# Patient Record
(demographics unavailable — no encounter records)

---

## 2024-11-16 NOTE — HISTORY OF PRESENT ILLNESS
[FreeTextEntry1] : Since his last visit, Mr. 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  Had R ptosis initially resolved with rest.  Following day had recurrence of ptosis followed by diplopia fluctuating.   Had decreased mycophenolate from 500 mg BID to 250 mg BID.  Had no had recommended b/w done at last visit.  Family noted slight nasal dysarthria with problems clearing his throat prompting hospital ER visit earlier this week.  While in the ED was seen by neurology ACP and had VC/NIF 4L/-40 and was deemed fairly stable and discharged on pyridostigmine 60 mg TID with mycophenolate 500 mg BID.    Since discharge had been taking pyridostigmine TID but still continues to have recurrent ptosis and diplopia 2 hrs after dose.  Denies any side effects to medications.  Last CT Chest 1.5 y ago.  Denies any orthopnea but has some MENDEZ.  No swallowing or dysarthria currently.

## 2024-11-16 NOTE — PHYSICAL EXAM
[FreeTextEntry1] : NAD. AOx3. Intact memory. Speech fluent, nondysarthric. CN 2 - 12 w/ intermittent R ptosis with fatigue and diplopia on sustained upgaze and lateral gaze.  NF/NE 5/5. no fatigue. Strength 5/5 b/l UE/LE. NL tone, bulk. No abnl movements. DTRs 2+ throughout. Plantar response downgoing b/l. (-) Hoffmans, clonus. Sensory intact LT/PP, pain, temp, proprioception and vibration. NL FTN/HKS. No dysdiadokinesia. Gait narrow based/NL tandem.

## 2024-11-16 NOTE — ASSESSMENT
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  Did not have serologic testing since last visit suspect persistent MG despite thymectomy recommend increase mycophenolate and restart prednisone as temporary measure.    Recommendation: - check b/w including AChR Abs  - increase mycophenolate to goal of 1g BID - restart prednisone 20 mg QD and taper slowly over 2 months to 15 mg QD - continue pyridostigmine 60 mg TID - QID - continue secondary stroke prevention with ASA 81, statins -- restart PPI - f/u w PCP for DM - RTC 3 months.

## 2024-11-16 NOTE — ASSESSMENT
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  Did not have serologic testing since last visit suspect persistent MG despite thymectomy recommend increase mycophenolate and restart prednisone as temporary measure.    Recommendation: - check b/w including AChR Abs  - increase mycophenolate to goal of 1g BID - restart prednisone 20 mg QD and taper slowly over 2 months to 15 mg QD - continue pyridostigmine 60 mg TID - QID - continue secondary stroke prevention with ASA 81, statins -- restart PPI - f/u w PCP for DM - RTC 3 months. Discharged

## 2025-03-25 NOTE — PHYSICAL EXAM
[Sclera] : the sclera and conjunctiva were normal [Neck Appearance] : the appearance of the neck was normal [Respiration, Rhythm And Depth] : normal respiratory rhythm and effort [Heart Rate And Rhythm] : heart rate was normal and rhythm regular [Bowel Sounds] : normal bowel sounds [Abdomen Soft] : soft [Involuntary Movements] : no involuntary movements were seen [Skin Color & Pigmentation] : normal skin color and pigmentation [Skin Turgor] : normal skin turgor [No Focal Deficits] : no focal deficits [Oriented To Time, Place, And Person] : oriented to person, place, and time [Impaired Insight] : insight and judgment were intact

## 2025-04-01 NOTE — HISTORY OF PRESENT ILLNESS
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  Pt had CT chest for surveillance revealing hiatal hernia. Here today for review of esophagram and CT chest.   PMHx/PSHx of renal CA- s/p partial nephrectomy, GERD, myasthenia gravis, HTN, hypercholesteremia, kidney stones, and obstructive sleep apnea on CPAP, Covid/PNA 8/2021 and endocarditis 2021 and Robotic Right and Left VATS, Thymectomy from 6/2021, last CT imaging revealed 6 cm hiatal hernia, arrives today for follow up of a recent esophagram.  Baseline GERD score: 7 on PPI   His healthcare teams is as follows: PMD: (Bubba Barreto Cardio: Noble Neuro: Dr. Paul Nephro: Pratik Uro: Alejandro RA: Moshrefdi  ECOG 0 independent  Former smoker quit 27 years ago Works as

## 2025-04-01 NOTE — ASSESSMENT
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  Pt had CT chest for surveillance revealing hiatal hernia. Here today for review of esophagram and CT chest.  CT chest reviewed Stable lung nodules - images reviewed back to 2021 Esophagram done - moderate HH  On omeprazole - doing well   F/U CTS in 1 year with CT chest and for surgical discussion for hiatal hernia Referred to GI   I, Miguel Vigil saw, examined and reviewed the diagnostic images on patient:  LYSSA ZAMAN on 03/25/2025 and agreed with my Nurse Practitioner's clinical note, physical exam findings and treatment plan. Patient presented to the office for a surveillance follow-up.  He has a diagnosis of myasthenia gravis.  He is a status post robotic assisted left thoracoscopy extended thymectomy.  Procedure date June 2021.  Procedure without complications.  Patient with significant improvement in myasthenia symptoms after resection but last year had to go back on medical treatment.  Overall myasthenia symptoms are significantly better compared to before resection.  CT scan of the chest from 3/4/2025 no evidence of recurrence.  Esophagram with a moderate hiatal hernia and gastroesophageal reflux to the level of the aortic arch.  I have a lengthy discussion regarding surgical treatment of hiatal hernia and reflux disease.  Surgical risk and possible complication as well as expected recovery and expected lifestyle modification discussed.  Patient is not sure about surgical treatment at this time.  He is to return to the office in 1 year with a chest CT.

## 2025-04-01 NOTE — ASSESSMENT
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  Pt had CT chest for surveillance revealing hiatal hernia. Here today for review of esophagram and CT chest.  CT chest reviewed Stable lung nodules - images reviewed back to 2021 Esophagram done - moderate HH  On omeprazole - doing well   F/U CTS in 1 year with CT chest and for surgical discussion for hiatal hernia Referred to GI   I, Miguel Vigil saw, examined and reviewed the diagnostic images on patient:  LYSSA ZAMAN on 03/25/2025 and agreed with my Nurse Practitioner's clinical note, physical exam findings and treatment plan. Patient presented to the office for a surveillance follow-up.  He has a diagnosis of myasthenia gravis.  He is a status post robotic assisted left thoracoscopy extended thymectomy.  Procedure date June 2021.  Procedure without complications.  Patient with significant improvement in myasthenia symptoms after resection but last year had to go back on medical treatment.  Overall myasthenia symptoms are significantly better compared to before resection.  CT scan of the chest from 3/4/2025 no evidence of recurrence.  Esophagram with a moderate hiatal hernia and gastroesophageal reflux to the level of the aortic arch.  I have a lengthy discussion regarding surgical treatment of hiatal hernia and reflux disease.  Surgical risk and possible complication as well as expected recovery and expected lifestyle modification discussed.  Patient is not sure about surgical treatment at this time.  He is to return to the office in 1 year with a chest CT.

## 2025-04-01 NOTE — HISTORY OF PRESENT ILLNESS
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  Pt had CT chest for surveillance revealing hiatal hernia. Here today for review of esophagram and CT chest.   PMHx/PSHx of renal CA- s/p partial nephrectomy, GERD, myasthenia gravis, HTN, hypercholesteremia, kidney stones, and obstructive sleep apnea on CPAP, Covid/PNA 8/2021 and endocarditis 2021 and Robotic Right and Left VATS, Thymectomy from 6/2021, last CT imaging revealed 6 cm hiatal hernia, arrives today for follow up of a recent esophagram.  Baseline GERD score: 7 on PPI   His healthcare teams is as follows: PMD: (Bubba Barreto Cardio: Noble Neuro: Dr. Paul Nephro: Pratik Uro: Alejandro RA: Moshrefdi  ECOG 0 independent  Former smoker quit 27 years ago Works as

## 2025-05-22 NOTE — HISTORY OF PRESENT ILLNESS
[FreeTextEntry1] : 63 yo RHM with PMHx of generalized MG (ACHr+ class 2b) s/p robotic assist VATS thymectomy complicated by bacteremia/endocarditis, RCC s/p partial L nephrectomy, nephrolithiasis, HTN, HLD, prior severe COVID presenting for follow up.  Since last visit, he had some rough time towards the end of the year however since January though, he feels more normal. Though he gets some mild right eye ptosis daily without any double vision.  In addition, he gets mild SOB on exertion.   At the end of Feb and beginning of March, he weaned himself off of prednisone. Despite stopping the prednisone, he feels like he has been doing okay. Denies any adverse reaction from his meds.   MG- ADL: 3 - daiily mild right eye droop, MENDEZ

## 2025-05-22 NOTE — HISTORY OF PRESENT ILLNESS
[FreeTextEntry1] : 65 yo RHM with PMHx of generalized MG (ACHr+ class 2b) s/p robotic assist VATS thymectomy complicated by bacteremia/endocarditis, RCC s/p partial L nephrectomy, nephrolithiasis, HTN, HLD, prior severe COVID presenting for follow up.  Since last visit, he had some rough time towards the end of the year however since January though, he feels more normal. Though he gets some mild right eye ptosis daily without any double vision.  In addition, he gets mild SOB on exertion.   At the end of Feb and beginning of March, he weaned himself off of prednisone. Despite stopping the prednisone, he feels like he has been doing okay. Denies any adverse reaction from his meds.   MG- ADL: 3 - daiily mild right eye droop, MENDEZ

## 2025-05-22 NOTE — ASSESSMENT
[FreeTextEntry1] : 65 yo RHM with PMHx of generalized MG (ACHr+ class 2b) s/p robotic assist VATS thymectomy complicated by bacteremia/endocarditis, RCC s/p partial L nephrectomy, nephrolithiasis, HTN, HLD, prior severe COVID presenting for follow up. On exam, has diplopia on right gaze with curtain sign + on the right. Review of blood work should still elevated ACHR antibodies. Otherwise appears mostly controlled on mycophenolate and pyridostigmine. Would adjust meds to optimize symptom control   Plan: - Increase mycophenolate to 1g BID - c/w omeprazole as needed for hiatal hernia - c/w pyridostigmine 60mg TID as needed  - Obtain blood work CBC, CMP, amylase, lipase, GGT, lipid panel - Follow up in 6 months

## 2025-05-22 NOTE — PHYSICAL EXAM
[FreeTextEntry1] : Mental status: Awake, alert and oriented x3.  Recent and remote memory intact.  Naming, repetition and comprehension intact.  Attention/concentration intact.  No dysarthria, no aphasia.  Fund of knowledge appropriate.   Cranial nerves: Pupils equally round and reactive to light, visual fields full but diplopia on right gaze, no nystagmus, extraocular muscles intact, V1 through V3 intact bilaterally and symmetric, face symmetric, hearing intact to finger rub, palate elevation symmetric, tongue was midline.   Motor:  MRC grading 5/5 b/l UE/LE.   strength 5/5.  Normal tone and bulk.  No abnormal movements.   Sensation: Intact to light touch   Coordination: No dysmetria on finger-to-nose.  No dysdiadokinesia.   Reflexes: 2+ in bilateral UE/LE, downgoing toes bilaterally. (-) Jones.   Gait: Narrow and steady. No ataxia.  Romberg negative  Special test: Curtain sign + on the right

## 2025-06-17 NOTE — ASSESSMENT
[FreeTextEntry1] : 64 yr old male with Myasthenia Gravis, H/O Endocarditis in 2021 (followed by Dr. Dickey),H/O B/L DVTs, HLD, H/O Renal ca, S/P partial nephrectomy, HTN, MITZI (on CPAP), GERD, Hiatal hernia, presents for CRC screening.     GERD, longstanding with acute exacerbation  - Will schedule an EGD; risks vs benefits discussed - Pt told that he needs Cardiac clearance from Dr. Dickey to have procedures done  - He was educated about the GERD diet - Continue omeprazole 20 mg OD prn for now  CRC Screening - Will schedule a colonoscopy on the same day as the EGD; risks vs benefits discussed but need Cardiac clearanced from Dr. Dickey   F/U after procedures are done

## 2025-06-17 NOTE — HISTORY OF PRESENT ILLNESS
[FreeTextEntry1] : 64 yr old male with Myasthenia Gravis, H/O Endocarditis in 2021 (followed by Dr. Dickey),H/O B/L DVTs, HLD, H/O Renal ca, S/P partial nephrectomy, HTN, MITZI (on CPAP), GERD, Hiatal hernia, presents for CRC screening.   He has a H/O GERD overall controlled by omeprazole 20 mg OD prn; he usually takes it 4 times a week. He takes it regularly whenever he is on steroids for Myasthenia Gravis. No dysphagia, vomiting, abd pain. He has lost 17 bs in the past 6 months intentionally. No constipation or blood in the stool.   His mother had gastric cancer. No family H/O colon, or other GI cancers. Last EGD was done about 44 yrs ago  Esophagram done 3/4/25 reviewed: GERD to the level of the aortic arch and a moderate hiatal hernia  Labs done 5/22/25 reviewed: CBC Hgb 15.8 CMP GFR 95 LFTs normal    INTERVAL HPI/OVERNIGHT EVENTS:  Pt seen and examined at bedside. No acute overnight events or complaints.    Brief Daily Plan:  -    VITAL SIGNS:  T(F): 100.6 (01-01-25 @ 03:42)  HR: 107 (01-01-25 @ 03:42)  BP: 111/66 (12-31-24 @ 20:19)  RR: 19 (12-31-24 @ 20:19)  SpO2: 99% (12-31-24 @ 20:19)  Wt(kg): --    PHYSICAL EXAM:    CONSTITUTIONAL: Uncomfortable  EYES: PERRL, EOMI, No conjunctival or scleral injection, non-icteric  HENMT: macrocephaly, MMM. poor dentition  NECK: Supple  RESP: No respiratory distress, no use of accessory muscles; CTA b/l, no W/R/R  CV: RRR, 3/6 systolic murmur  GI: Soft, NT, ND  MSK: No clubbing or cyanosis. 1+ BLE edema  SKIN: No rashes or ulcers noted  NEURO: CN II-XII grossly intact; no focal deficits  PSYCH: A&O x 3, affect appropriate      MEDICATIONS  (STANDING):  heparin   Injectable 5000 Unit(s) SubCutaneous every 12 hours  naloxone Injectable 0.4 milliGRAM(s) IV Push once  oseltamivir 30 milliGRAM(s) Oral every 24 hours  piperacillin/tazobactam IVPB.. 3.375 Gram(s) IV Intermittent every 12 hours  polyethylene glycol 3350 17 Gram(s) Oral daily  senna 2 Tablet(s) Oral at bedtime    MEDICATIONS  (PRN):  bisacodyl 5 milliGRAM(s) Oral daily PRN Constipation  melatonin 5 milliGRAM(s) Oral at bedtime PRN Insomnia  oxyCODONE    IR 10 milliGRAM(s) Oral every 8 hours PRN Moderate Pain (4 - 6)  oxyCODONE    IR 15 milliGRAM(s) Oral every 8 hours PRN Severe Pain (7 - 10)      Allergies    shellfish (Hives)  No Known Drug Allergies    Intolerances        LABS:                        10.7   3.72  )-----------( 143      ( 30 Dec 2024 18:14 )             33.5     12-30    139  |  94[L]  |  36[H]  ----------------------------<  82  4.4   |  28  |  5.87[H]    Ca    9.3      30 Dec 2024 18:14    TPro  7.1  /  Alb  4.0  /  TBili  0.4  /  DBili  x   /  AST  7[L]  /  ALT  <5[L]  /  AlkPhos  1215[H]  12-30    PT/INR - ( 30 Dec 2024 18:14 )   PT: 12.8 sec;   INR: 1.12 ratio         PTT - ( 30 Dec 2024 18:14 )  PTT:30.0 sec  Urinalysis Basic - ( 30 Dec 2024 18:14 )    Color: x / Appearance: x / SG: x / pH: x  Gluc: 82 mg/dL / Ketone: x  / Bili: x / Urobili: x   Blood: x / Protein: x / Nitrite: x   Leuk Esterase: x / RBC: x / WBC x   Sq Epi: x / Non Sq Epi: x / Bacteria: x        RADIOLOGY & ADDITIONAL TESTS:  Reviewed  ******************  Authored By: Chaim Buck MD, PGY1  MS Teams Preferred  ******************   INTERVAL HPI/OVERNIGHT EVENTS:  Pt seen and examined at bedside. No acute overnight events or complaints.    Brief Daily Plan:  - potassium 7 on BMP with increase of cr 5s to 10s.   - reached out to nephrology to do dialysis today before treating hyperkalemia medically  - cw empiric abx and tamiflu while pending blood cultures     VITAL SIGNS:  T(F): 100.6 (01-01-25 @ 03:42)  HR: 107 (01-01-25 @ 03:42)  BP: 111/66 (12-31-24 @ 20:19)  RR: 19 (12-31-24 @ 20:19)  SpO2: 99% (12-31-24 @ 20:19)  Wt(kg): --    PHYSICAL EXAM:    CONSTITUTIONAL: Uncomfortable  EYES: PERRL, EOMI, No conjunctival or scleral injection, non-icteric  HENMT: macrocephaly, MMM. poor dentition  NECK: Supple  RESP: No respiratory distress, no use of accessory muscles; CTA b/l, no W/R/R  CV: RRR, 3/6 systolic murmur  GI: Soft, NT, ND  MSK: No clubbing or cyanosis. 1+ BLE edema  SKIN: No rashes or ulcers noted  NEURO: CN II-XII grossly intact; no focal deficits  PSYCH: A&O x 3, affect appropriate      MEDICATIONS  (STANDING):  heparin   Injectable 5000 Unit(s) SubCutaneous every 12 hours  naloxone Injectable 0.4 milliGRAM(s) IV Push once  oseltamivir 30 milliGRAM(s) Oral every 24 hours  piperacillin/tazobactam IVPB.. 3.375 Gram(s) IV Intermittent every 12 hours  polyethylene glycol 3350 17 Gram(s) Oral daily  senna 2 Tablet(s) Oral at bedtime    MEDICATIONS  (PRN):  bisacodyl 5 milliGRAM(s) Oral daily PRN Constipation  melatonin 5 milliGRAM(s) Oral at bedtime PRN Insomnia  oxyCODONE    IR 10 milliGRAM(s) Oral every 8 hours PRN Moderate Pain (4 - 6)  oxyCODONE    IR 15 milliGRAM(s) Oral every 8 hours PRN Severe Pain (7 - 10)      Allergies    shellfish (Hives)  No Known Drug Allergies    Intolerances        LABS:                        10.7   3.72  )-----------( 143      ( 30 Dec 2024 18:14 )             33.5     12-30    139  |  94[L]  |  36[H]  ----------------------------<  82  4.4   |  28  |  5.87[H]    Ca    9.3      30 Dec 2024 18:14    TPro  7.1  /  Alb  4.0  /  TBili  0.4  /  DBili  x   /  AST  7[L]  /  ALT  <5[L]  /  AlkPhos  1215[H]  12-30    PT/INR - ( 30 Dec 2024 18:14 )   PT: 12.8 sec;   INR: 1.12 ratio         PTT - ( 30 Dec 2024 18:14 )  PTT:30.0 sec  Urinalysis Basic - ( 30 Dec 2024 18:14 )    Color: x / Appearance: x / SG: x / pH: x  Gluc: 82 mg/dL / Ketone: x  / Bili: x / Urobili: x   Blood: x / Protein: x / Nitrite: x   Leuk Esterase: x / RBC: x / WBC x   Sq Epi: x / Non Sq Epi: x / Bacteria: x        RADIOLOGY & ADDITIONAL TESTS:  Reviewed  ******************  Authored By: Chaim Buck MD, PGY1  MS Teams Preferred  ******************

## 2025-06-17 NOTE — HISTORY OF PRESENT ILLNESS
[FreeTextEntry1] : 64 yr old male with Myasthenia Gravis, H/O Endocarditis in 2021 (followed by Dr. Dickey),H/O B/L DVTs, HLD, H/O Renal ca, S/P partial nephrectomy, HTN, MITZI (on CPAP), GERD, Hiatal hernia, presents for CRC screening.   He has a H/O GERD overall controlled by omeprazole 20 mg OD prn; he usually takes it 4 times a week. He takes it regularly whenever he is on steroids for Myasthenia Gravis. No dysphagia, vomiting, abd pain. He has lost 17 bs in the past 6 months intentionally. No constipation or blood in the stool.   His mother had gastric cancer. No family H/O colon, or other GI cancers. Last EGD was done about 44 yrs ago  Esophagram done 3/4/25 reviewed: GERD to the level of the aortic arch and a moderate hiatal hernia  Labs done 5/22/25 reviewed: CBC Hgb 15.8 CMP GFR 95 LFTs normal